# Patient Record
Sex: MALE
[De-identification: names, ages, dates, MRNs, and addresses within clinical notes are randomized per-mention and may not be internally consistent; named-entity substitution may affect disease eponyms.]

---

## 2019-12-13 ENCOUNTER — HOSPITAL ENCOUNTER (OUTPATIENT)
Dept: HOSPITAL 63 - US | Age: 64
Discharge: HOME | End: 2019-12-13
Attending: FAMILY MEDICINE
Payer: OTHER GOVERNMENT

## 2019-12-13 DIAGNOSIS — M79.89: Primary | ICD-10-CM

## 2019-12-13 PROCEDURE — 93971 EXTREMITY STUDY: CPT

## 2019-12-13 NOTE — RAD
Examination: VENOUS LOWER EXTREMITY LEFT

 

History: Pain and swelling.

 

Comparison/Correlation: None

 

 

FINDINGS: Left lower extremity duplex venous ultrasound exam was 

performed. Grayscale, color Doppler, and spectral Doppler imaging was 

performed. Compression and augmentation was performed.

 

The left common femoral vein, superficial  femoral vein, popliteal vein, 

and greater saphenous vein are normal with no evidence of deep venous 

thrombus. Normal compressibility and augmentation is evident.

 

Left calf veins are not well visualized. Flow is seen within left 

posterior tibial veins.

 

Incidental note is made of a 10 cm longitudinal by 6.2 cm x 2.9 cm 

well-circumscribed extensively septated collection or mass. No flow 

evident on color Doppler imaging. This finding is present within the 

proximal to mid medial left calf region.

 

 

IMPRESSION:

Complex collection or mass involving the left proximal to mid medial calf.

This may represent a hematoma or other complex collection. Interval 

follow-up to resolution is recommended. Further evaluation with MRI 

without and with contrast if able may be considered if clinically 

warranted to exclude possibility of neoplastic process.

 

No evidence of deep venous thrombus involving the left lower extremity.

 

Electronically signed by: Julien Paulino MD (12/13/2019 8:06 PM) [facility]